# Patient Record
Sex: MALE | Race: WHITE | URBAN - METROPOLITAN AREA
[De-identification: names, ages, dates, MRNs, and addresses within clinical notes are randomized per-mention and may not be internally consistent; named-entity substitution may affect disease eponyms.]

---

## 2021-12-01 ENCOUNTER — OFFICE VISIT (OUTPATIENT)
Dept: URBAN - METROPOLITAN AREA CLINIC 107 | Facility: CLINIC | Age: 46
End: 2021-12-01
Payer: COMMERCIAL

## 2021-12-01 ENCOUNTER — DASHBOARD ENCOUNTERS (OUTPATIENT)
Age: 46
End: 2021-12-01

## 2021-12-01 ENCOUNTER — WEB ENCOUNTER (OUTPATIENT)
Dept: URBAN - METROPOLITAN AREA CLINIC 107 | Facility: CLINIC | Age: 46
End: 2021-12-01

## 2021-12-01 VITALS
SYSTOLIC BLOOD PRESSURE: 137 MMHG | HEIGHT: 63 IN | TEMPERATURE: 98.5 F | DIASTOLIC BLOOD PRESSURE: 81 MMHG | BODY MASS INDEX: 40.93 KG/M2 | RESPIRATION RATE: 18 BRPM | WEIGHT: 231 LBS | HEART RATE: 81 BPM

## 2021-12-01 DIAGNOSIS — R23.2 FLUSHING REACTION: ICD-10-CM

## 2021-12-01 DIAGNOSIS — R19.7 DIARRHEA: ICD-10-CM

## 2021-12-01 PROCEDURE — 99204 OFFICE O/P NEW MOD 45 MIN: CPT | Performed by: NURSE PRACTITIONER

## 2021-12-01 RX ORDER — TRAZODONE HYDROCHLORIDE 50 MG/1
1 TABLET AT BEDTIME AS NEEDED TABLET, FILM COATED ORAL ONCE A DAY
Status: ACTIVE | COMMUNITY

## 2021-12-01 NOTE — HPI-TODAY'S VISIT:
47yo male self-referred for evaluation of a possible food allergy.  About 7 months ago, he began a new diet plan (cannot recall name) which consisting mostly of soy-containing products. After about a week on the plan, he developed the sensation of pins and needles throughout his body and hot flashes following meals. He thought this may due to an allergy to soy, so he stopped the diet and symptoms subsided. Within the last 3 weeks, he has noticed similar symptoms following consumption of wheat. Within minutes of a meal, he will experience the onset of flushing and diffuse tingling. This will last about 20 minutes, resolving without intervention. He denies associated abdominal pain, nausea, vomiting, reflux symptoms, or difficulty swallowing. He typically has a daily nonbloody stool. On occasion, he will experience an urgent, loose stool following consumption of gluten. He is concerned about the possibility of celiac disease. He has never had an EGD or colonoscopy.

## 2021-12-03 ENCOUNTER — TELEPHONE ENCOUNTER (OUTPATIENT)
Dept: URBAN - METROPOLITAN AREA CLINIC 113 | Facility: CLINIC | Age: 46
End: 2021-12-03

## 2022-02-21 ENCOUNTER — TELEPHONE ENCOUNTER (OUTPATIENT)
Dept: URBAN - METROPOLITAN AREA CLINIC 113 | Facility: CLINIC | Age: 47
End: 2022-02-21